# Patient Record
Sex: FEMALE | Race: WHITE | URBAN - METROPOLITAN AREA
[De-identification: names, ages, dates, MRNs, and addresses within clinical notes are randomized per-mention and may not be internally consistent; named-entity substitution may affect disease eponyms.]

---

## 2019-01-31 ENCOUNTER — TRANSFERRED RECORDS (OUTPATIENT)
Dept: HEALTH INFORMATION MANAGEMENT | Facility: CLINIC | Age: 74
End: 2019-01-31

## 2019-02-14 ENCOUNTER — OFFICE VISIT (OUTPATIENT)
Dept: RADIATION THERAPY | Facility: OUTPATIENT CENTER | Age: 74
End: 2019-02-14
Payer: MEDICARE

## 2019-02-14 VITALS
RESPIRATION RATE: 18 BRPM | OXYGEN SATURATION: 96 % | HEART RATE: 88 BPM | DIASTOLIC BLOOD PRESSURE: 70 MMHG | WEIGHT: 186.2 LBS | SYSTOLIC BLOOD PRESSURE: 126 MMHG

## 2019-02-14 DIAGNOSIS — K21.9 GERD (GASTROESOPHAGEAL REFLUX DISEASE): ICD-10-CM

## 2019-02-14 RX ORDER — FAMOTIDINE 20 MG/1
20 TABLET, FILM COATED ORAL 2 TIMES DAILY
COMMUNITY
Start: 2018-12-12

## 2019-02-14 RX ORDER — CRANBERRY FRUIT EXTRACT 200 MG
2 CAPSULE ORAL DAILY
COMMUNITY
Start: 2018-12-12

## 2019-02-14 RX ORDER — LETROZOLE 2.5 MG/1
2.5 TABLET, FILM COATED ORAL DAILY
COMMUNITY
Start: 2019-01-31 | End: 2019-10-21

## 2019-02-14 RX ORDER — AMOXICILLIN 500 MG
1200 CAPSULE ORAL DAILY
COMMUNITY

## 2019-02-14 SDOH — HEALTH STABILITY: MENTAL HEALTH: HOW OFTEN DO YOU HAVE A DRINK CONTAINING ALCOHOL?: NEVER

## 2019-02-14 NOTE — LETTER
2019      RE: Orly Sheridan  [address]  [facility] [address]          Department of Radiation Oncology  Radiation Therapy Center  [facility]  [address], [address]  [place] [address]  [phone]     Consultation Note    Name: Orly Sheridan MRN: [id number]   : 1945   Date of Service: 2019  Referring: Dr. Tate and Dr. Fuentes     Reason for consultation: Invasive ductal carcinoma of the right breast status post lumpectomy and sentinel lymph node evaluation.  Final pathology demonstrated invasive ductal carcinoma, grade 2, 1.8 cm, ER positive, SC positive, Her2 negative, no LVSI, negative margins, 0 out of 2 sentinel lymph nodes, pathologic T1cN0.  Evaluate role of adjuvant whole breast radiation therapy.    History of Present Illness   Ms. Sheridan is a 73 year old female with newly diagnosed right breast invasive ductal carcinoma status post lumpectomy and sentinel lymph node evaluation.  The patient presents today to discuss potential role of adjuvant whole breast radiation therapy.    Patient underwent a screening mammogram which demonstrated a spiculated mass in the right breast.  Diagnostic imaging on 2018 demonstrated a 1.8 x 1.7 x 1.1 cm mass in the right breast, at the 12 o'clock position, 9 cm from the nipple.  Patient subsequent underwent biopsy of the breast mass on 2018.  Final pathology demonstrated invasive ductal carcinoma, grade 2, no LV SI, positive DCIS, SC positive, SC positive, HER-2 negative.  On 2019 the patient underwent right breast lumpectomy and sentinel lymph node evaluation by Dr. Tate. Final pathology demonstrated invasive ductal carcinoma, grade 2, 1.8 cm, ER positive, SC positive, Her2 negative, no LVSI, negative margins, 0 out of 2 sentinel lymph nodes, pathologic T1cN0.  Postoperative the patient was seen by Dr. Fuentes who recommended adjuvant anti-endocrine therapy.  Patient presents today to discuss the potential role  of adjuvant whole breast radiation therapy.    Is overall doing well.  No issues with range of motion.  No history of lymphedema.  No prior history of radiation.  No history of connective tissue disorder.  No prior history of radiation.  No pacemaker.    Past Medical History:   No past medical history on file.    Past Surgical History:   No past surgical history on file.    Chemotherapy History:  none    Radiation History:  none    Pregnant: Not Applicable  Implanted Cardiac Devices: No    Medications:  No current outpatient medications on file.     No current facility-administered medications for this visit.        Allergies:   Allergies not on file    Family History:  No family history on file.    Review of Systems   A 10-point review of systems was performed. Pertinent findings are noted in the HPI.    Physical Exam   ECOG Status: 1    Vitals:  There were no vitals taken for this visit.    Gen: Alert, in NAD  Head: NC/AT  Eyes: PERRL, EOMI, sclera anicteric  Ears: No external auricular lesions  Nose/sinus: No rhinorrhea or epistaxis  Oral cavity/oropharynx: MMM, no visible oral cavity lesions, FOM and BOT are soft to palpation  Neck: Full ROM, supple, no palpable adenopathy  Pulm: No wheezing, stridor or respiratory distress  CV: Extremities are warm and well-perfused, no cyanosis, no pedal edema  Abdominal: Normal bowel sounds, soft, nontender, no masses  Musculoskeletal: Normal bulk and tone, no issues with ROM  Lymph: No extremity lymphedema.   Skin: Normal color and turgor  Neuro: A/Ox3, CN II-XII intact, normal gait    Imaging/Path/Labs   Imagin/18/18  Mammogram  Spiculated lesion 12 o'clock position of the RIGHT breast, approximately 9   cm from the nipple, highly worrisome for malignancy.      FINDINGS:   The breasts are scattered fibroglandular breast densities.  In the 12   o'clock position of the RIGHT breast, 9 cm from the nipple, there is a   spiculated-appearing lesion measuring 1.8 x 1.7 x  1.1 cm. An ultrasound   was performed.  On ultrasound, there is a ill-defined, hypoechoic lesion   measuring approximately 1.4 x 0.9 cm. This is posterior shadowing and   spiculated in appearance. This is highly worrisome for malignancy.  Biopsy   was subsequently performed.      BI-RADS Category 5: Highly Suggestive of Malignancy        Path:   12/18/18  A) RIGHT BREAST, 12:00, 9 CM FROM NIPPLE, STEREOTACTIC-GUIDED CORE BIOPSY:  1. Invasive ductal carcinoma     a. [place] grade: II of III; Linda score: 6 of 9     b. Angio-lymphatic invasion: Absent     c. Associated DCIS: Present (focal)     d. Subtype: Cribriform with calcifications      e. Grade of DCIS: 2 of 3  2. Breast Ancillary Testing:        a. Hormone Receptors:             Estrogen receptor: Positive (99%, strong staining)            Progesterone receptor: Positive (8%, moderate staining)      b. HER2 by IHC: Negative (1+ by manual morphometry)      1/18/19  A) RIGHT BREAST, SEED-LOCALIZED LUMPECTOMY:  1. Invasive ductal carcinoma, Linda grade II of III      a. Invasive tumor size: 1.8 cm      b. Invasive carcinoma is located less than 0.1 cm from the inferior margin (see part C for final anterior-inferior margin status)      c. Invasive carcinoma is located less than 0.1 cm from the final posterior margin and 0.15 cm from the final lateral margin (see comment)    2. Breast Ancillary Testing: Performed on prior case ([id number])      a. Hormone Receptors:             Estrogen receptor: Positive (99%, strong staining)            Progesterone receptor: Positive (8%, moderate staining)      b. HER2 by IHC: Negative (1+ by manual morphometry)  3. Core biopsy site is associated with tumor     B) RIGHT AXILLARY SENTINEL LYMPH NODE, EXCISION:  1. One benign lymph node    C) RIGHT BREAST, ADDITIONAL ANTERIOR-INFERIOR MARGINS, EXCISION:  1. Benign breast tissue  2. Negative for atypia and malignancy    Assessment    Ms. Sheridan is a 73 year old female  with newly diagnosed right breast invasive ductal carcinoma status post lumpectomy and sentinel lymph node evaluation. Final pathology demonstrated invasive ductal carcinoma, grade 2, 1.8 cm, ER positive, [place] positive, Her2 negative, no LVSI, negative margins, 0 out of 2 sentinel lymph nodes, pathologic T1cN0.   The patient presents today to discuss potential role of adjuvant whole breast radiation therapy.    Plan   We discussed that adjuvant radiation is the standard of care for patients with invasive ductal carcinoma after lumpectomy. The most recent update of the EBCTCG metaanalysis for early stage breast cancer showed that for patients with pathologically node negative disease, radiotherapy reduces the 5 year risk of any recurrence from 31% to 15% (Lancet, 2011). This translated into a 3.3% absolute survival benefit at 15 years for all-comers.     However, we also discussed that this benefit is reduced for older patients. In the CALGB trial (Paola, Lancet, 2013), patients over 70 years of age with hormone positive T1 invasive ductal carcinoma after lumpectomy were treated with tamoxifen alone versus tamoxifen plus radiation. They experienced locoregional recurrence of 2% versus 10% at 10 years with no difference in overall survival.     Ms. Sheridan does  meet eligibility criteria for the aforementioned CALGB trial and therefore, would be a candidate for adjuvant RT or observation. If we would treat with radiation, I would recommend adjuvant radiation therapy to the right breast without a boost.    If the patient decides to be treated with RT, we would recommend hypofractionated radiation therapy based on results from the [place] hypofractionation trial (Harini et al, NEJM, 2010) and the recent update of the [place] START trial (Reji et al, Lancet Oncol, 2013), which both show equivalent local control, survival and cosmesis with these shorter treatment schedules as compared to conventional radiation fractionation.  After our discussion, the patient is interested in proceeding with hypofractionated treatment to the right breast.  We recommend a dose of 4005 cGy in 15 fractions.    Today we discussed the logistics of treatment planning and delivery in detail with the patient. We also discussed side effects, including short term risks of fatigue and skin reaction, and long term risks of pneumonitis, lung fibrosis, soft tissue fibrosis, skin changes, breast contour changes, rib fractures, cardiac damage, secondary cancers, lymphedema, and thyroid dysfunction. We described the use of 3D-conformal radiotherapy to minimize dose to the normal tissues, while adequately covering the target tissues, and the ability of this technique to decrease potential for toxicity.    With regards to sparing of the heart, we discussed the use of deep inspiration breath hold for treatment planning and delivery. This method has been shown to significantly reduce dose to the lung and heart as compared to treatment with free breathing (Christ et al, AJCO, 2012). We discussed that this will involve 3D-conformal treatment planning, with contouring of the heart, and that limiting heart dose will be a high priority for treatment planning, as will dose to the contralateral breast and lung.     The risks and benefits of radiation therapy were discussed in detail with the patient. The patient expressed an understanding of these risks and has agreed to proceed with the proposed treatment. Informed consent was obtained. CT simulation has been scheduled for next week.    Anurag Saunders MD  Department of Radiation Oncology  [facility]

## 2019-02-14 NOTE — PROGRESS NOTES
Department of Radiation Oncology  Radiation Therapy Center  [facility]  [address], [address]  [place] [address]  [phone]       Consultation Note    Name: Orly Sheridan MRN: [id number]   : 1945   Date of Service: 2019  Referring: Dr. Tate and Dr. Fuentes     Reason for consultation: Invasive ductal carcinoma of the right breast status post lumpectomy and sentinel lymph node evaluation.  Final pathology demonstrated invasive ductal carcinoma, grade 2, 1.8 cm, ER positive, [place] positive, Her2 negative, no LVSI, negative margins, 0 out of 2 sentinel lymph nodes, pathologic T1cN0.  Evaluate role of adjuvant whole breast radiation therapy.    History of Present Illness   Ms. Sheridan is a 73 year old female with newly diagnosed right breast invasive ductal carcinoma status post lumpectomy and sentinel lymph node evaluation.  The patient presents today to discuss potential role of adjuvant whole breast radiation therapy.    Patient underwent a screening mammogram which demonstrated a spiculated mass in the right breast.  Diagnostic imaging on 2018 demonstrated a 1.8 x 1.7 x 1.1 cm mass in the right breast, at the 12 o'clock position, 9 cm from the nipple.  Patient subsequent underwent biopsy of the breast mass on 2018.  Final pathology demonstrated invasive ductal carcinoma, grade 2, no LV SI, positive DCIS, TX positive, TX positive, HER-2 negative.  On 2019 the patient underwent right breast lumpectomy and sentinel lymph node evaluation by Dr. Tate. Final pathology demonstrated invasive ductal carcinoma, grade 2, 1.8 cm, ER positive, TX positive, Her2 negative, no LVSI, negative margins, 0 out of 2 sentinel lymph nodes, pathologic T1cN0.  Postoperative the patient was seen by Dr. Fuentes who recommended adjuvant anti-endocrine therapy.  Patient presents today to discuss the potential role of adjuvant whole breast radiation therapy.    Is overall doing  well.  No issues with range of motion.  No history of lymphedema.  No prior history of radiation.  No history of connective tissue disorder.  No prior history of radiation.  No pacemaker.      Past Medical History:   No past medical history on file.    Past Surgical History:   No past surgical history on file.    Chemotherapy History:  none    Radiation History:  none    Pregnant: Not Applicable  Implanted Cardiac Devices: No    Medications:  No current outpatient medications on file.     No current facility-administered medications for this visit.          Allergies:   Allergies not on file    Family History:  No family history on file.    Review of Systems   A 10-point review of systems was performed. Pertinent findings are noted in the HPI.    Physical Exam   ECOG Status: 1    Vitals:  There were no vitals taken for this visit.    Gen: Alert, in NAD  Head: NC/AT  Eyes: PERRL, EOMI, sclera anicteric  Ears: No external auricular lesions  Nose/sinus: No rhinorrhea or epistaxis  Oral cavity/oropharynx: MMM, no visible oral cavity lesions, FOM and BOT are soft to palpation  Neck: Full ROM, supple, no palpable adenopathy  Pulm: No wheezing, stridor or respiratory distress  CV: Extremities are warm and well-perfused, no cyanosis, no pedal edema  Abdominal: Normal bowel sounds, soft, nontender, no masses  Musculoskeletal: Normal bulk and tone, no issues with ROM  Lymph: No extremity lymphedema.   Skin: Normal color and turgor  Neuro: A/Ox3, CN II-XII intact, normal gait    Imaging/Path/Labs   Imagin/18/18  Mammogram  Spiculated lesion 12 o'clock position of the RIGHT breast, approximately 9   cm from the nipple, highly worrisome for malignancy.      FINDINGS:   The breasts are scattered fibroglandular breast densities.  In the 12   o'clock position of the RIGHT breast, 9 cm from the nipple, there is a   spiculated-appearing lesion measuring 1.8 x 1.7 x 1.1 cm. An ultrasound   was performed.  On ultrasound, there  is a ill-defined, hypoechoic lesion   measuring approximately 1.4 x 0.9 cm. This is posterior shadowing and   spiculated in appearance. This is highly worrisome for malignancy.  Biopsy   was subsequently performed.      BI-RADS Category 5: Highly Suggestive of Malignancy        Path:   12/18/18  A) RIGHT BREAST, 12:00, 9 CM FROM NIPPLE, STEREOTACTIC-GUIDED CORE BIOPSY:  1. Invasive ductal carcinoma     a. Linda grade: II of III; Linda score: 6 of 9     b. Angio-lymphatic invasion: Absent     c. Associated DCIS: Present (focal)     d. Subtype: Cribriform with calcifications      e. Grade of DCIS: 2 of 3  2. Breast Ancillary Testing:        a. Hormone Receptors:             Estrogen receptor: Positive (99%, strong staining)            Progesterone receptor: Positive (8%, moderate staining)      b. HER2 by IHC: Negative (1+ by manual morphometry)      1/18/19  A) RIGHT BREAST, SEED-LOCALIZED LUMPECTOMY:  1. Invasive ductal carcinoma, [place] grade II of III      a. Invasive tumor size: 1.8 cm      b. Invasive carcinoma is located less than 0.1 cm from the inferior margin (see part C for final anterior-inferior margin status)      c. Invasive carcinoma is located less than 0.1 cm from the final posterior margin and 0.15 cm from the final lateral margin (see comment)    2. Breast Ancillary Testing: Performed on prior case ([id number])      a. Hormone Receptors:             Estrogen receptor: Positive (99%, strong staining)            Progesterone receptor: Positive (8%, moderate staining)      b. HER2 by IHC: Negative (1+ by manual morphometry)  3. Core biopsy site is associated with tumor     B) RIGHT AXILLARY SENTINEL LYMPH NODE, EXCISION:  1. One benign lymph node    C) RIGHT BREAST, ADDITIONAL ANTERIOR-INFERIOR MARGINS, EXCISION:  1. Benign breast tissue  2. Negative for atypia and malignancy    Assessment    Ms. Sheriadn is a 73 year old female with newly diagnosed right breast invasive ductal carcinoma  status post lumpectomy and sentinel lymph node evaluation. Final pathology demonstrated invasive ductal carcinoma, grade 2, 1.8 cm, ER positive, [place] positive, Her2 negative, no LVSI, negative margins, 0 out of 2 sentinel lymph nodes, pathologic T1cN0.   The patient presents today to discuss potential role of adjuvant whole breast radiation therapy.    Plan   We discussed that adjuvant radiation is the standard of care for patients with invasive ductal carcinoma after lumpectomy. The most recent update of the EBCTCG metaanalysis for early stage breast cancer showed that for patients with pathologically node negative disease, radiotherapy reduces the 5 year risk of any recurrence from 31% to 15% (Lancet, 2011). This translated into a 3.3% absolute survival benefit at 15 years for all-comers.     However, we also discussed that this benefit is reduced for older patients. In the CALGB trial (Paola, Lancet, 2013), patients over 70 years of age with hormone positive T1 invasive ductal carcinoma after lumpectomy were treated with tamoxifen alone versus tamoxifen plus radiation. They experienced locoregional recurrence of 2% versus 10% at 10 years with no difference in overall survival.     Ms. Sheridan does  meet eligibility criteria for the aforementioned CALGB trial and therefore, would be a candidate for adjuvant RT or observation. If we would treat with radiation, I would recommend adjuvant radiation therapy to the right breast without a boost.    If the patient decides to be treated with RT, we would recommend hypofractionated radiation therapy based on results from the [place] hypofractionation trial (Harini et al, NEJM, 2010) and the recent update of the [place] START trial (Reji et al, Lancet Oncol, 2013), which both show equivalent local control, survival and cosmesis with these shorter treatment schedules as compared to conventional radiation fractionation. After our discussion, the patient is interested in  proceeding with hypofractionated treatment to the right breast.  We recommend a dose of 4005 cGy in 15 fractions.    Today we discussed the logistics of treatment planning and delivery in detail with the patient. We also discussed side effects, including short term risks of fatigue and skin reaction, and long term risks of pneumonitis, lung fibrosis, soft tissue fibrosis, skin changes, breast contour changes, rib fractures, cardiac damage, secondary cancers, lymphedema, and thyroid dysfunction. We described the use of 3D-conformal radiotherapy to minimize dose to the normal tissues, while adequately covering the target tissues, and the ability of this technique to decrease potential for toxicity.    With regards to sparing of the heart, we discussed the use of deep inspiration breath hold for treatment planning and delivery. This method has been shown to significantly reduce dose to the lung and heart as compared to treatment with free breathing (Christ et al, AJCO, 2012). We discussed that this will involve 3D-conformal treatment planning, with contouring of the heart, and that limiting heart dose will be a high priority for treatment planning, as will dose to the contralateral breast and lung.     The risks and benefits of radiation therapy were discussed in detail with the patient. The patient expressed an understanding of these risks and has agreed to proceed with the proposed treatment. Informed consent was obtained. CT simulation has been scheduled for next week.    Anurag Saunders MD  Department of Radiation Oncology  [facility]

## 2019-02-14 NOTE — NURSING NOTE
REASON FOR APPOINTMENT   Type of Cancer: IDC   Location: R breast   Date of Symptom Onset: mammogram screening    TREATMENT TO-DATE FOR THIS CANCER  Surgery ? 1/18/19 [facility]   Chemotherapy ? Not indicated. Will follow with Dr. Fuentes [facility] for antihormone therapy, femara   Other Treatments for this Cancer ? Radiation - discussion for treatment    PERSONAL HISTORY OF CANCER   Previous Cancer ? no   Prior Radiation ? no   Prior Chemotherapy ? no   Prior Hormonal Therapy ? no     RECENT IMAGING STUDIES  mammogram    REFERRALS NEEDED  None at this time    VITALS  /70   Pulse 88   Resp 18   Wt 84.5 kg (186 lb 3.2 oz)   SpO2 96%     PACEMAKER/IMPLANTED CARDIAC DEVICE no    PAIN  Denies    PSYCHOSOCIAL  Marital Status:   Patient lives in [place] (10 m out of town) with , Mohamud.  Number of children: 3  Working status: retired  Do you feel safe in your home? Yes    REVIEW OF SYSTEMS  Skin: surgical site healing well per pt report  Eyes: glasses  Ears/Nose/Throat: negative  Respiratory: No shortness of breath, dyspnea on exertion, cough, or hemoptysis  Cardiovascular: irregular heart beat  Gastrointestinal: negative  Genitourinary: negative  Musculoskeletal: negative  Neurologic: negative  Psychiatric: negative  Hematologic/Lymphatic/Immunologic: negative  Endocrine: negative    WOMEN ONLY  Any chance you may be pregnant: No  Age at first period: 15  Date of last period: 54  Number of pregnancies: 3  Birth Control pills: Yes  If yes, for how long: 3 mo    Radiation Oncology Patient Teaching    Current Concern: what will I feel like during radiation?    Person involved with teaching: Patient and   Patient asked Questions: Yes  Patient was cooperative: Yes  Patient was receptive (willing to accept information given): Yes    Education Assessment  Comprehension ability: Medium  Knowledge level: Medium  Factors affecting teaching: None    Education Materials Given  Radiation Therapy and  You  Radiation to the Breast  Caring for Your Skin During Radiation ...    Educational Topics Discussed  Side effects, Activity, Nutrition, Adjustment to illness and When to call MD/RN    Response To Teaching  More review necessary    Do you have an advanced directive or living will? no  Are you DNR/DNI? no

## 2019-02-21 ENCOUNTER — APPOINTMENT (OUTPATIENT)
Dept: RADIATION THERAPY | Facility: OUTPATIENT CENTER | Age: 74
End: 2019-02-21
Payer: MEDICARE

## 2019-02-26 ENCOUNTER — AMBULATORY - HEALTHEAST (OUTPATIENT)
Dept: OTHER | Facility: CLINIC | Age: 74
End: 2019-02-26

## 2019-02-26 ENCOUNTER — DOCUMENTATION ONLY (OUTPATIENT)
Dept: OTHER | Facility: CLINIC | Age: 74
End: 2019-02-26

## 2019-03-04 ENCOUNTER — APPOINTMENT (OUTPATIENT)
Dept: RADIATION THERAPY | Facility: OUTPATIENT CENTER | Age: 74
End: 2019-03-04
Payer: MEDICARE

## 2019-03-05 ENCOUNTER — APPOINTMENT (OUTPATIENT)
Dept: RADIATION THERAPY | Facility: OUTPATIENT CENTER | Age: 74
End: 2019-03-05
Payer: MEDICARE

## 2019-03-06 ENCOUNTER — APPOINTMENT (OUTPATIENT)
Dept: RADIATION THERAPY | Facility: OUTPATIENT CENTER | Age: 74
End: 2019-03-06
Payer: MEDICARE

## 2019-03-06 ENCOUNTER — OFFICE VISIT (OUTPATIENT)
Dept: RADIATION THERAPY | Facility: OUTPATIENT CENTER | Age: 74
End: 2019-03-06
Payer: MEDICARE

## 2019-03-06 VITALS — WEIGHT: 185 LBS

## 2019-03-06 DIAGNOSIS — C50.919 MALIGNANT NEOPLASM OF FEMALE BREAST, UNSPECIFIED ESTROGEN RECEPTOR STATUS, UNSPECIFIED LATERALITY, UNSPECIFIED SITE OF BREAST (H): Primary | ICD-10-CM

## 2019-03-06 ASSESSMENT — PAIN SCALES - GENERAL: PAINLEVEL: NO PAIN (0)

## 2019-03-06 NOTE — LETTER
3/6/2019      RE: Orly Sheridan  [address]  [facility] [address]       [facility]  SPECIALIZING IN BREAKTHROUGHS  Radiation Oncology    On Treatment Visit Note    Orly Sheridan      Date: 3/6/2019   MRN: [id number]   : 1945  Diagnosis: IDC St I      Reason for Visit:  On Radiation Treatment Visit     Treatment Summary to Date  Treatment Site: R breast Current Dose: 801/4005 cGy Fractions: 3/15      Subjective:   Doing well. No breast pain or tenderness. No pruritus. Doing well. Applying skin creams.     Nursing ROS:   Nutrition Alteration  Diet Type: Patient's Preference  Skin  Skin Reaction: 0 - No changes  Skin Note: started aquaphor     Cardiovascular  Respiratory effort: 1 - Normal - without distress           Pain Assessment  0-10 Pain Scale: 0    Objective:   Wt 83.9 kg (185 lb)    No skin erythema or desquamation.    Labs:  CBC RESULTS: No results for input(s): WBC, RBC, HGB, HCT, MCV, MCH, MCHC, RDW, PLT in the last 94801 hours.  ELECTROLYTES:  No results for input(s): NA, POTASSIUM, CHLORIDE, NATALIE, CO2, BUN, CR, GLC in the last 13516 hours.    Assessment:    Tolerating radiation therapy well.  All questions and concerns addressed.    Plan:     1. Continue current therapy.    2. Continue skin care.  3. Neosporin with pain relief or ibuprofen PRN pain.      Mosaiq chart and setup information reviewed  Ports checked    Medication Review  Med list reviewed with patient?: Yes    Educational Topic Discussed  Education Instructions: reviewed skin care      Anurag Saunders MD

## 2019-03-06 NOTE — PROGRESS NOTES
[facility] PHYSICIANS  SPECIALIZING IN BREAKTHROUGHS  Radiation Oncology    On Treatment Visit Note      Orly Sheridan      Date: 3/6/2019   MRN: [id number]   : 1945  Diagnosis: IDC St I      Reason for Visit:  On Radiation Treatment Visit     Treatment Summary to Date  Treatment Site: R breast Current Dose: 801/4005 cGy Fractions: 3/15           Subjective:   Doing well. No breast pain or tenderness. No pruritus. Doing well. Applying skin creams.     Nursing ROS:   Nutrition Alteration  Diet Type: Patient's Preference  Skin  Skin Reaction: 0 - No changes  Skin Note: started aquaphor        Cardiovascular  Respiratory effort: 1 - Normal - without distress           Pain Assessment  0-10 Pain Scale: 0      Objective:   Wt 83.9 kg (185 lb)    No skin erythema or desquamation.    Labs:  CBC RESULTS: No results for input(s): WBC, RBC, HGB, HCT, MCV, MCH, MCHC, RDW, PLT in the last 28503 hours.  ELECTROLYTES:  No results for input(s): NA, POTASSIUM, CHLORIDE, NATALIE, CO2, BUN, CR, GLC in the last 56220 hours.    Assessment:    Tolerating radiation therapy well.  All questions and concerns addressed.    Plan:     1. Continue current therapy.    2. Continue skin care.  3. Neosporin with pain relief or ibuprofen PRN pain.      Mosaiq chart and setup information reviewed  Ports checked    Medication Review  Med list reviewed with patient?: Yes    Educational Topic Discussed  Education Instructions: reviewed skin care      Anurag Saunders MD

## 2019-03-07 ENCOUNTER — APPOINTMENT (OUTPATIENT)
Dept: RADIATION THERAPY | Facility: OUTPATIENT CENTER | Age: 74
End: 2019-03-07
Payer: MEDICARE

## 2019-03-08 ENCOUNTER — APPOINTMENT (OUTPATIENT)
Dept: RADIATION THERAPY | Facility: OUTPATIENT CENTER | Age: 74
End: 2019-03-08
Payer: MEDICARE

## 2019-03-11 ENCOUNTER — APPOINTMENT (OUTPATIENT)
Dept: RADIATION THERAPY | Facility: OUTPATIENT CENTER | Age: 74
End: 2019-03-11
Payer: MEDICARE

## 2019-03-12 ENCOUNTER — APPOINTMENT (OUTPATIENT)
Dept: RADIATION THERAPY | Facility: OUTPATIENT CENTER | Age: 74
End: 2019-03-12
Payer: MEDICARE

## 2019-03-13 ENCOUNTER — OFFICE VISIT (OUTPATIENT)
Dept: RADIATION THERAPY | Facility: OUTPATIENT CENTER | Age: 74
End: 2019-03-13
Payer: MEDICARE

## 2019-03-13 ENCOUNTER — APPOINTMENT (OUTPATIENT)
Dept: RADIATION THERAPY | Facility: OUTPATIENT CENTER | Age: 74
End: 2019-03-13
Payer: MEDICARE

## 2019-03-13 VITALS
RESPIRATION RATE: 18 BRPM | WEIGHT: 185.8 LBS | HEART RATE: 74 BPM | DIASTOLIC BLOOD PRESSURE: 81 MMHG | SYSTOLIC BLOOD PRESSURE: 148 MMHG | OXYGEN SATURATION: 97 %

## 2019-03-13 DIAGNOSIS — C50.919 MALIGNANT NEOPLASM OF BREAST IN FEMALE, ESTROGEN RECEPTOR POSITIVE, UNSPECIFIED LATERALITY, UNSPECIFIED SITE OF BREAST (H): Primary | ICD-10-CM

## 2019-03-13 DIAGNOSIS — Z17.0 MALIGNANT NEOPLASM OF BREAST IN FEMALE, ESTROGEN RECEPTOR POSITIVE, UNSPECIFIED LATERALITY, UNSPECIFIED SITE OF BREAST (H): Primary | ICD-10-CM

## 2019-03-13 NOTE — PROGRESS NOTES
[facility] PHYSICIANS  SPECIALIZING IN BREAKTHROUGHS  Radiation Oncology    On Treatment Visit Note      Orly Sheridan      Date: 3/13/2019   MRN: [id number]   : 1945  Diagnosis: IDC St I      Reason for Visit:  On Radiation Treatment Visit     Treatment Summary to Date  Treatment Site: R breast Current Dose: 2134/4005 cGy Fractions: 8/15           Subjective:   Doing well. No breast pain or tenderness. No pruritus. Doing well. Applying skin creams.     Nursing ROS:   Nutrition Alteration  Diet Type: Patient's Preference  Skin  Skin Reaction: 0 - No changes  Skin Note: started aquaphor        Cardiovascular  Respiratory effort: 1 - Normal - without distress           Pain Assessment  0-10 Pain Scale: 0      Objective:   /81   Pulse 74   Resp 18   Wt 84.3 kg (185 lb 12.8 oz)   SpO2 97%   No skin erythema or desquamation.    Labs:  CBC RESULTS: No results for input(s): WBC, RBC, HGB, HCT, MCV, MCH, MCHC, RDW, PLT in the last 22550 hours.  ELECTROLYTES:  No results for input(s): NA, POTASSIUM, CHLORIDE, NATALIE, CO2, BUN, CR, GLC in the last 42482 hours.    Assessment:    Tolerating radiation therapy well.  All questions and concerns addressed.    Plan:     1. Continue current therapy.    2. Continue skin care.  3. Neosporin with pain relief or ibuprofen PRN pain.      Mosaiq chart and setup information reviewed  Ports checked    Medication Review  Med list reviewed with patient?: Yes    Educational Topic Discussed  Education Instructions: reviewed skin care      Anurag Saunders MD

## 2019-03-13 NOTE — LETTER
3/13/2019      RE: Orly Sheridan  [address]  [facility] [address]       [facility] PHYSICIANS  SPECIALIZING IN BREAKTHROUGHS  Radiation Oncology    On Treatment Visit Note      Orly Sheridan      Date: 3/13/2019   MRN: [id number]   : 1945  Diagnosis: IDC St I      Reason for Visit:  On Radiation Treatment Visit     Treatment Summary to Date  Treatment Site: R breast Current Dose: 2134/4005 cGy Fractions: 8/15           Subjective:   Doing well. No breast pain or tenderness. No pruritus. Doing well. Applying skin creams.     Nursing ROS:   Nutrition Alteration  Diet Type: Patient's Preference  Skin  Skin Reaction: 0 - No changes  Skin Note: started aquaphor        Cardiovascular  Respiratory effort: 1 - Normal - without distress           Pain Assessment  0-10 Pain Scale: 0      Objective:   /81   Pulse 74   Resp 18   Wt 84.3 kg (185 lb 12.8 oz)   SpO2 97%   No skin erythema or desquamation.    Labs:  CBC RESULTS: No results for input(s): WBC, RBC, HGB, HCT, MCV, MCH, MCHC, RDW, PLT in the last 46355 hours.  ELECTROLYTES:  No results for input(s): NA, POTASSIUM, CHLORIDE, NATALIE, CO2, BUN, CR, GLC in the last 45966 hours.    Assessment:    Tolerating radiation therapy well.  All questions and concerns addressed.    Plan:     1. Continue current therapy.    2. Continue skin care.  3. Neosporin with pain relief or ibuprofen PRN pain.      Mosaiq chart and setup information reviewed  Ports checked    Medication Review  Med list reviewed with patient?: Yes    Educational Topic Discussed  Education Instructions: reviewed skin care      Anurag Saunders MD

## 2019-03-14 ENCOUNTER — APPOINTMENT (OUTPATIENT)
Dept: RADIATION THERAPY | Facility: OUTPATIENT CENTER | Age: 74
End: 2019-03-14
Payer: MEDICARE

## 2019-03-15 ENCOUNTER — APPOINTMENT (OUTPATIENT)
Dept: RADIATION THERAPY | Facility: OUTPATIENT CENTER | Age: 74
End: 2019-03-15
Payer: MEDICARE

## 2019-03-18 ENCOUNTER — APPOINTMENT (OUTPATIENT)
Dept: RADIATION THERAPY | Facility: OUTPATIENT CENTER | Age: 74
End: 2019-03-18
Payer: MEDICARE

## 2019-03-19 ENCOUNTER — APPOINTMENT (OUTPATIENT)
Dept: RADIATION THERAPY | Facility: OUTPATIENT CENTER | Age: 74
End: 2019-03-19
Payer: MEDICARE

## 2019-03-20 ENCOUNTER — OFFICE VISIT (OUTPATIENT)
Dept: RADIATION THERAPY | Facility: OUTPATIENT CENTER | Age: 74
End: 2019-03-20
Payer: MEDICARE

## 2019-03-20 ENCOUNTER — APPOINTMENT (OUTPATIENT)
Dept: RADIATION THERAPY | Facility: OUTPATIENT CENTER | Age: 74
End: 2019-03-20
Payer: MEDICARE

## 2019-03-20 VITALS
HEART RATE: 80 BPM | RESPIRATION RATE: 18 BRPM | DIASTOLIC BLOOD PRESSURE: 81 MMHG | OXYGEN SATURATION: 96 % | SYSTOLIC BLOOD PRESSURE: 175 MMHG | WEIGHT: 187.6 LBS

## 2019-03-20 DIAGNOSIS — Z17.0 MALIGNANT NEOPLASM OF BREAST IN FEMALE, ESTROGEN RECEPTOR POSITIVE, UNSPECIFIED LATERALITY, UNSPECIFIED SITE OF BREAST (H): Primary | ICD-10-CM

## 2019-03-20 DIAGNOSIS — C50.919 MALIGNANT NEOPLASM OF BREAST IN FEMALE, ESTROGEN RECEPTOR POSITIVE, UNSPECIFIED LATERALITY, UNSPECIFIED SITE OF BREAST (H): Primary | ICD-10-CM

## 2019-03-20 NOTE — PROGRESS NOTES
[facility] PHYSICIANS  SPECIALIZING IN BREAKTHROUGHS  Radiation Oncology    On Treatment Visit Note      Orly Sheridan      Date: 3/20/2019   MRN: [id number]   : 1945  Diagnosis: IDC St I      Reason for Visit:  On Radiation Treatment Visit     Treatment Summary to Date  Treatment Site: R breast Current Dose: 2134/4005 cGy Fractions: 13/15           Subjective:   Doing well. No breast pain or tenderness. No pruritus. Doing well. Applying skin creams.     Nursing ROS:   Nutrition Alteration  Diet Type: Patient's Preference  Skin  Skin Reaction: 0 - No changes  Skin Note: started aquaphor        Cardiovascular  Respiratory effort: 1 - Normal - without distress           Pain Assessment  0-10 Pain Scale: 0      Objective:   /81   Pulse 74   Resp 18   Wt 84.3 kg (185 lb 12.8 oz)   SpO2 97%   No skin erythema or desquamation.    Labs:  CBC RESULTS: No results for input(s): WBC, RBC, HGB, HCT, MCV, MCH, MCHC, RDW, PLT in the last 11262 hours.  ELECTROLYTES:  No results for input(s): NA, POTASSIUM, CHLORIDE, NATALIE, CO2, BUN, CR, GLC in the last 76374 hours.    Assessment:    Tolerating radiation therapy well.  All questions and concerns addressed.    Plan:     1. Continue current therapy.  EOT Friday. RTC in 1 month with NP.  2. Continue skin care.  3. Neosporin with pain relief or ibuprofen PRN pain.      Mosaiq chart and setup information reviewed  Ports checked    Medication Review  Med list reviewed with patient?: Yes    Educational Topic Discussed  Education Instructions: reviewed skin care      Anurag Saunders MD

## 2019-03-21 ENCOUNTER — APPOINTMENT (OUTPATIENT)
Dept: RADIATION THERAPY | Facility: OUTPATIENT CENTER | Age: 74
End: 2019-03-21
Payer: MEDICARE

## 2019-03-22 ENCOUNTER — APPOINTMENT (OUTPATIENT)
Dept: RADIATION THERAPY | Facility: OUTPATIENT CENTER | Age: 74
End: 2019-03-22
Payer: MEDICARE

## 2019-03-28 ENCOUNTER — DOCUMENTATION ONLY (OUTPATIENT)
Dept: RADIATION THERAPY | Facility: OUTPATIENT CENTER | Age: 74
End: 2019-03-28

## 2019-03-28 NOTE — PROGRESS NOTES
Department of Radiation Oncology  Radiation Therapy Center  [facility] [place] [address]  [phone]       Radiotherapy Treatment Summary          2019    PATIENT: Orly Sheridan  MEDICAL RECORD NO: [id number]   : 1945    DIAGNOSIS:  Invasive ductal carcinoma of the right breast status post lumpectomy and sentinel lymph node evaluation  INTENT OF RADIOTHERAPY: curative  PATHOLOGY:  invasive ductal carcinoma, grade 2, 1.8 cm, ER positive, FL positive, Her2 negative, no LVSI, negative margins, 0 out of 2 sentinel lymph nodes                               STAGE: pathologic T1cN0.  CONCURRENT SYSTEMIC THERAPY:  No    ONCOLOGIC HISTORY:     Ms. Sheridan is a 73 year old female with newly diagnosed right breast invasive ductal carcinoma status post lumpectomy and sentinel lymph node evaluation.      Patient underwent a screening mammogram which demonstrated a spiculated mass in the right breast. Diagnostic imaging on 2018 demonstrated a 1.8 x 1.7 x 1.1 cm mass in the right breast, at the 12 o'clock position, 9 cm from the nipple.  Patient subsequently underwent biopsy of the breast mass on 2018.  Final pathology demonstrated invasive ductal carcinoma, grade 2, no LVSI, positive DCIS, ER positive, FL positive, HER-2 negative.  On 2019 the patient underwent right breast lumpectomy and sentinel lymph node evaluation by Dr. Tate. Final pathology demonstrated invasive ductal carcinoma, grade 2, 1.8 cm, ER positive, FL positive, Her2 negative, no LVSI, negative margins, 0 out of 2 sentinel lymph nodes, pathologic T1cN0.  Postoperative the patient was seen by Dr. Fuentes who recommended adjuvant anti-endocrine therapy.         SITE OF TREATMENT: Right breast    DATES  OF TREATMENT: 3/04/19-3/22/19    TOTAL DOSE OF TREATMENT: 4005 cGy    DOSE PER FRACTION OF TREATMENT: 267 cGy x 15 fractions       COMMENT/TOXICITY:     No breast pain or tenderness. No pruritus.  Applying skin creams.              PAIN MANAGEMENT:                           Neosporin with pain relief or ibuprofen PRN pain.    FOLLOW UP PLAN:  1.  RTC in 1 month with NP.  2. Continue skin care.    Radiation Oncologist: Anurag Saunders M.D.  Department of Radiation Oncology  [facility]

## 2019-04-11 NOTE — PROGRESS NOTES
Department of Radiation Oncology  Radiation Therapy Center  [facility] [place] [address]  [phone]     Radiation Oncology Follow-up Visit  19    Orly Sheridan  MRN: [id number]   : 1945     DIAGNOSIS:  Invasive ductal carcinoma of the right breast status post lumpectomy and sentinel lymph node evaluation  INTENT OF RADIOTHERAPY: curative  PATHOLOGY:  invasive ductal carcinoma, grade 2, 1.8 cm, ER positive, MS positive, Her2 negative, no LVSI, negative margins, 0 out of 2 sentinel lymph nodes                               STAGE: pathologic T1cN0.  CONCURRENT SYSTEMIC THERAPY:  No     ONCOLOGIC HISTORY:     Ms. Sheridan is a 73 year old female with newly diagnosed right breast invasive ductal carcinoma status post lumpectomy and sentinel lymph node evaluation.      Patient underwent a screening mammogram which demonstrated a spiculated mass in the right breast. Diagnostic imaging on 2018 demonstrated a 1.8 x 1.7 x 1.1 cm mass in the right breast, at the 12 o'clock position, 9 cm from the nipple.  Patient subsequently underwent biopsy of the breast mass on 2018.  Final pathology demonstrated invasive ductal carcinoma, grade 2, no LVSI, positive DCIS, ER positive, MS positive, HER-2 negative.  On 2019 the patient underwent right breast lumpectomy and sentinel lymph node evaluation by Dr. Tate. Final pathology demonstrated invasive ductal carcinoma, grade 2, 1.8 cm, ER positive, MS positive, Her2 negative, no LVSI, negative margins, 0 out of 2 sentinel lymph nodes, pathologic T1cN0.  Postoperative the patient was seen by Dr. Fuentes who recommended adjuvant anti-endocrine therapy (letrozole). Genetic testing negative.     SITE OF TREATMENT: Right breast     DATES  OF TREATMENT: 3/04/19-3/22/19     TOTAL DOSE OF TREATMENT: 4005 cGy     DOSE PER FRACTION OF TREATMENT: 267 cGy x 15 fractions       COMMENT/TOXICITY:     No breast pain or tenderness. No pruritus.  "Applying skin creams.                   INTERVAL SINCE COMPLETION OF RADIATION THERAPY:   1 month    SUBJECTIVE:   Orly Sheridan is a 73 year old female who is here today for routine 1 month follow up after completing radiation therapy.  She has seen healing of her skin reaction. She reports skin irritation last week with sensitivity, nipple tenderness, and sensation that \"bra felt uncomfortable\". Since that time she feels the skin irritation is much improved. Mild erythema reported, intact skin, no desquamation, and hyperpigmentation to right axilla and inframammary fold right. She denies any concerning lumps or masses, nipple discharge bilaterally. She continues to moisturize. Reports well healed incisions with no scar formation.  Denies any cough or shortness of breath related to possible risk of interstitial pneumonitis.  Good ROM. She also has had resolution of her fatigue.  No lymphedema.     Tolerating letrozole with some hot flashes she reports were present before starting medication. She denies any new joint pain. She had history of bilateral lower back discomfort, that is mostly stable. She also has history of mild hair loss - taking biotin and using special shampoo.    ROS: Patient denies any of the following except if noted above:  pain, headaches,chest pain, dyspnea, abdominal pain, nausea, vomiting, diarrhea. ROS otherwise negative on a 12-system review.  ECO       Current Outpatient Medications   Medication     Calcium Carbonate-Vit D-Min (CALCIUM 1200 PO)     cholecalciferol (D 5000) 5000 units CAPS     famotidine (PEPCID) 20 MG tablet     letrozole (FEMARA) 2.5 MG tablet     Omega-3 Fatty Acids (FISH OIL) 1200 MG capsule     Red Yeast Rice Extract 600 MG CAPS     No current facility-administered medications for this visit.           Allergies   Allergen Reactions     Thimerosal Itching     Other reaction(s): Erythema       Past Medical History:   Diagnosis Date     Breast cancer, right breast (H) " 12/18/2018    bx 12/18/18     GERD (gastroesophageal reflux disease)      Family History   Problem Relation Age of Onset     Breast Cancer Paternal Aunt 55     Breast Cancer Maternal Cousin 49        first cousin     Breast Cancer Maternal Cousin 35        second cousin       PHYSICAL EXAM:  /82   Pulse 79   Resp 18   Wt 83.7 kg (184 lb 9.6 oz)   SpO2 96%   Gen: Alert, in NAD  Pulm: No wheezing, stridor or respiratory distress  CV: Well-perfused, no cyanosis, no pedal edema  Skin: Normal color and turgor  Psychiatric: Appropriate mood and affect  BREAST: No masses or discharge bilaterally. Skin intact. Hyperpigmenatation right IMF and axilla consistent with radiation changes. Well healed incisions to right breast.     LABS AND IMAGING:  Reviewed.  [facility]  Bone Density Report  [employer] scanner    Indication:   Post menopausal. Estrogen deficiency. Low calcium intake.   Osteopenia. Vitamin D deficiency.    Results:    The study is a technically valid scan    Location T Score Z score   AP Spine 0.1 1.8   Left Femur -0.9 0.9   Right Femur -1.7 0.0     DIAGNOSIS BY WORLD HEALTH ORGANIZATION GUIDELINES    OSTEOPENIA (LOW BONE MINERAL DENSITY)      IMPRESSION:   Ms. Sheridan is a 73 year old female with newly diagnosed right breast invasive ductal carcinoma status post lumpectomy and sentinel lymph node evaluation. Completed radiotherapy to right breast 4005 cGy on 3/22/19. Here for one month follow-up. Adjuvant Letrozole.     PLAN:     Acute toxicities from RT improving. Has mild residual hyperpigmenation which will continue to improve with time. Discussed long term care with continued moisturizing of the treated breast, and sun screen.  Discussed possibility of scar formation from radiation. Discussed treatment with gentle massage. She will follow up here in 6 months with Dr. Saunders (alternate).    Patient will follow up with medical oncology for continued care and imaging (  Alfredo). (6 mths follow-up). According to NCCN guidelines, follow-up of breast cancer should include history and physical examination with emphasis on breast examination every 4-12 months for 5 years as clinically appropriate then annually thereafter. Emphasis is also on continuing with annual mammography. Last jermaine screening mammogram 12/2018. Emphasis is also on continuing with annual mammography no sooner than 6 months post RT. Plans to have in 6 months then follow-up with surgeon.    Healthy lifestyle.  She should continue to refrain from tobacco. Discussed healthy diet  low in fats.  She should have an exercise program of 150 minutes of cardiovascular exercise per week.  She will continue to see her primary care provider for general health maintenance (Dr. Aracely Swanson).  Continue to follow-up with eye doctor every 1-2 years, and dentist every 6 months for cleanings (she has been going Q 3 months).     Risk of developing osteoporosis. Osteopenia on DXA 2/2019.  She is post menopausal. She should continue to have DEXA scans every 2 years, and was recommended to perform weightbearing exercises and to supplement with 1200 mg of calcium, 1000 international unites of vitamin D daily.     Endocrine therapy. Continues to be adherent to adjuvant endocrine therapy. Tolerating letrozole (femara) well. Reports some hot flashes she reports were present before starting medication. She denies any new joint pain. She had history of bilateral lower back discomfort, that is mostly stable. She also has history of mild hair loss - taking biotene and using special shampoo. Letrozole managed by Dr. Shultz.     Cancer screening.  should undergo routine screening for age group.  She should limit her sun exposure and use sunscreens. Routine colonoscopy completed 2/2019. Next recommended in 5 years (family history).       Patito Coto NP  [facility] Radiation Oncology

## 2019-04-19 ENCOUNTER — OFFICE VISIT (OUTPATIENT)
Dept: RADIATION THERAPY | Facility: OUTPATIENT CENTER | Age: 74
End: 2019-04-19
Payer: MEDICARE

## 2019-04-19 VITALS
SYSTOLIC BLOOD PRESSURE: 151 MMHG | OXYGEN SATURATION: 96 % | RESPIRATION RATE: 18 BRPM | WEIGHT: 184.6 LBS | HEART RATE: 79 BPM | DIASTOLIC BLOOD PRESSURE: 82 MMHG

## 2019-04-19 DIAGNOSIS — C50.911 MALIGNANT NEOPLASM OF RIGHT BREAST IN FEMALE, ESTROGEN RECEPTOR POSITIVE, UNSPECIFIED SITE OF BREAST (H): Primary | ICD-10-CM

## 2019-04-19 DIAGNOSIS — Z17.0 MALIGNANT NEOPLASM OF RIGHT BREAST IN FEMALE, ESTROGEN RECEPTOR POSITIVE, UNSPECIFIED SITE OF BREAST (H): Primary | ICD-10-CM

## 2019-04-19 ASSESSMENT — PAIN SCALES - GENERAL: PAINLEVEL: NO PAIN (0)

## 2019-04-19 NOTE — LETTER
2019      RE: Orly Sheridan  [address]  [facility] [address]             Radiation Oncology Follow-up Visit  19    Orly Sheridan  MRN: [id number]   : 1945     DIAGNOSIS:  Invasive ductal carcinoma of the right breast status post lumpectomy and sentinel lymph node evaluation  INTENT OF RADIOTHERAPY: curative  PATHOLOGY:  invasive ductal carcinoma, grade 2, 1.8 cm, ER positive, NE positive, Her2 negative, no LVSI, negative margins, 0 out of 2 sentinel lymph nodes                               STAGE: pathologic T1cN0.  CONCURRENT SYSTEMIC THERAPY:  No     ONCOLOGIC HISTORY:     Ms. Sheridan is a 73 year old female with newly diagnosed right breast invasive ductal carcinoma status post lumpectomy and sentinel lymph node evaluation.      Patient underwent a screening mammogram which demonstrated a spiculated mass in the right breast. Diagnostic imaging on 2018 demonstrated a 1.8 x 1.7 x 1.1 cm mass in the right breast, at the 12 o'clock position, 9 cm from the nipple.  Patient subsequently underwent biopsy of the breast mass on 2018.  Final pathology demonstrated invasive ductal carcinoma, grade 2, no LVSI, positive DCIS, ER positive, NE positive, HER-2 negative.  On 2019 the patient underwent right breast lumpectomy and sentinel lymph node evaluation by Dr. Tate. Final pathology demonstrated invasive ductal carcinoma, grade 2, 1.8 cm, ER positive, NE positive, Her2 negative, no LVSI, negative margins, 0 out of 2 sentinel lymph nodes, pathologic T1cN0.  Postoperative the patient was seen by Dr. Fuentes who recommended adjuvant anti-endocrine therapy (letrozole). Genetic testing negative.     SITE OF TREATMENT: Right breast     DATES  OF TREATMENT: 3/04/19-3/22/19     TOTAL DOSE OF TREATMENT: 4005 cGy     DOSE PER FRACTION OF TREATMENT: 267 cGy x 15 fractions       COMMENT/TOXICITY:     No breast pain or tenderness. No pruritus. Applying skin creams.                   INTERVAL SINCE  "COMPLETION OF RADIATION THERAPY:   1 month    SUBJECTIVE:   Orly Sheridan is a 73 year old female who is here today for routine 1 month follow up after completing radiation therapy.  She has seen healing of her skin reaction. She reports skin irritation last week with sensitivity, nipple tenderness, and sensation that \"bra felt uncomfortable\". Since that time she feels the skin irritation is much improved. Mild erythema reported, intact skin, no desquamation, and hyperpigmentation to right axilla and inframammary fold right. She denies any concerning lumps or masses, nipple discharge bilaterally. She continues to moisturize. Reports well healed incisions with no scar formation.  Denies any cough or shortness of breath related to possible risk of interstitial pneumonitis.  Good ROM. She also has had resolution of her fatigue.  No lymphedema.     Tolerating letrozole with some hot flashes she reports were present before starting medication. She denies any new joint pain. She had history of bilateral lower back discomfort, that is mostly stable. She also has history of mild hair loss - taking biotin and using special shampoo.    ROS: Patient denies any of the following except if noted above:  pain, headaches,chest pain, dyspnea, abdominal pain, nausea, vomiting, diarrhea. ROS otherwise negative on a 12-system review.  ECO       Current Outpatient Medications   Medication     Calcium Carbonate-Vit D-Min (CALCIUM 1200 PO)     cholecalciferol (D 5000) 5000 units CAPS     famotidine (PEPCID) 20 MG tablet     letrozole (FEMARA) 2.5 MG tablet     Omega-3 Fatty Acids (FISH OIL) 1200 MG capsule     Red Yeast Rice Extract 600 MG CAPS     No current facility-administered medications for this visit.           Allergies   Allergen Reactions     Thimerosal Itching     Other reaction(s): Erythema       Past Medical History:   Diagnosis Date     Breast cancer, right breast (H) 2018    bx 20     GERD (gastroesophageal " reflux disease)      Family History   Problem Relation Age of Onset     Breast Cancer Paternal Aunt 55     Breast Cancer Maternal Cousin 49        first cousin     Breast Cancer Maternal Cousin 35        second cousin       PHYSICAL EXAM:  /82   Pulse 79   Resp 18   Wt 83.7 kg (184 lb 9.6 oz)   SpO2 96%   Gen: Alert, in NAD  Pulm: No wheezing, stridor or respiratory distress  CV: Well-perfused, no cyanosis, no pedal edema  Skin: Normal color and turgor  Psychiatric: Appropriate mood and affect  BREAST: No masses or discharge bilaterally. Skin intact. Hyperpigmenatation right IMF and axilla consistent with radiation changes. Well healed incisions to right breast.     LABS AND IMAGING:  Reviewed.  [facility]  Bone Density Report  [employer] scanner    Indication:   Post menopausal. Estrogen deficiency. Low calcium intake.   Osteopenia. Vitamin D deficiency.    Results:    The study is a technically valid scan    Location T Score Z score   AP Spine 0.1 1.8   Left Femur -0.9 0.9   Right Femur -1.7 0.0     DIAGNOSIS BY WORLD HEALTH ORGANIZATION GUIDELINES    OSTEOPENIA (LOW BONE MINERAL DENSITY)      IMPRESSION:   Ms. Sheridan is a 73 year old female with newly diagnosed right breast invasive ductal carcinoma status post lumpectomy and sentinel lymph node evaluation. Completed radiotherapy to right breast 4005 cGy on 3/22/19. Here for one month follow-up. Adjuvant Letrozole.     PLAN:     Acute toxicities from RT improving. Has mild residual hyperpigmenation which will continue to improve with time. Discussed long term care with continued moisturizing of the treated breast, and sun screen.  Discussed possibility of scar formation from radiation. Discussed treatment with gentle massage. She will follow up here in 6 months with Dr. Saunders (alternate).    Patient will follow up with medical oncology for continued care and imaging (Dr. Fuentes). (6 mths follow-up). According to NCCN  guidelines, follow-up of breast cancer should include history and physical examination with emphasis on breast examination every 4-12 months for 5 years as clinically appropriate then annually thereafter. Emphasis is also on continuing with annual mammography. Last jermaine screening mammogram 12/2018. Emphasis is also on continuing with annual mammography no sooner than 6 months post RT. Plans to have in 6 months then follow-up with surgeon.    Healthy lifestyle.  She should continue to refrain from tobacco. Discussed healthy diet  low in fats.  She should have an exercise program of 150 minutes of cardiovascular exercise per week.  She will continue to see her primary care provider for general health maintenance (Dr. Aracely Swanson).  Continue to follow-up with eye doctor every 1-2 years, and dentist every 6 months for cleanings (she has been going Q 3 months).     Risk of developing osteoporosis. Osteopenia on DXA 2/2019.  She is post menopausal. She should continue to have DEXA scans every 2 years, and was recommended to perform weightbearing exercises and to supplement with 1200 mg of calcium, 1000 international unites of vitamin D daily.     Endocrine therapy. Continues to be adherent to adjuvant endocrine therapy. Tolerating letrozole (femara) well. Reports some hot flashes she reports were present before starting medication. She denies any new joint pain. She had history of bilateral lower back discomfort, that is mostly stable. She also has history of mild hair loss - taking biotene and using special shampoo. Letrozole managed by Dr. Shultz.     Cancer screening.  should undergo routine screening for age group.  She should limit her sun exposure and use sunscreens. Routine colonoscopy completed 2/2019. Next recommended in 5 years (family history).       Patito Coto NP  [facility] Radiation Oncology

## 2019-04-19 NOTE — NURSING NOTE
FOLLOW-UP VISIT    Patient Name: Orly Sheridan      : 1945     Age: 73 year old        ______________________________________________________________________________     Chief Complaint   Patient presents with     Radiation Therapy     follow up     /82   Pulse 79   Resp 18   Wt 83.7 kg (184 lb 9.6 oz)   SpO2 96%      Date Radiation Completed: 1 month follow up, R breast    Pain  Denies    Meds  Current Med List Reviewed: Yes  Medication Note:     Skin: Warm  Dry  Intact    Range of Motion: no complaints    Respiratory: No shortness of breath, dyspnea on exertion, cough, or hemoptysis    Hormone Therapy: Yes    Lymphedema Follow up: No    Energy Level: normal      Appointments:     DATE  Oncologist: Dr. Fuentes    Surgeon:    Primary:      Other Notes:

## 2019-04-23 PROBLEM — C50.911 MALIGNANT NEOPLASM OF RIGHT FEMALE BREAST (H): Status: ACTIVE | Noted: 2019-04-23

## 2019-04-23 PROBLEM — Z80.0 FAMILY HISTORY OF COLON CANCER: Status: ACTIVE | Noted: 2017-12-05

## 2019-09-23 PROBLEM — C50.911 MALIGNANT NEOPLASM OF RIGHT FEMALE BREAST (H): Status: ACTIVE | Noted: 2018-12-18

## 2019-10-21 ENCOUNTER — OFFICE VISIT (OUTPATIENT)
Dept: RADIATION THERAPY | Facility: OUTPATIENT CENTER | Age: 74
End: 2019-10-21
Payer: MEDICARE

## 2019-10-21 VITALS
WEIGHT: 184.8 LBS | OXYGEN SATURATION: 96 % | DIASTOLIC BLOOD PRESSURE: 76 MMHG | RESPIRATION RATE: 18 BRPM | SYSTOLIC BLOOD PRESSURE: 168 MMHG | HEART RATE: 71 BPM

## 2019-10-21 DIAGNOSIS — C50.911 MALIGNANT NEOPLASM OF RIGHT BREAST IN FEMALE, ESTROGEN RECEPTOR POSITIVE, UNSPECIFIED SITE OF BREAST (H): Primary | ICD-10-CM

## 2019-10-21 DIAGNOSIS — I89.0 LYMPHEDEMA OF BREAST: ICD-10-CM

## 2019-10-21 DIAGNOSIS — Z17.0 MALIGNANT NEOPLASM OF RIGHT BREAST IN FEMALE, ESTROGEN RECEPTOR POSITIVE, UNSPECIFIED SITE OF BREAST (H): Primary | ICD-10-CM

## 2019-10-21 RX ORDER — ANASTROZOLE 1 MG/1
1 TABLET ORAL DAILY
COMMUNITY
Start: 2019-10-03 | End: 2020-03-31

## 2019-10-21 RX ORDER — METOPROLOL SUCCINATE 25 MG/1
25 TABLET, EXTENDED RELEASE ORAL DAILY
COMMUNITY
Start: 2019-09-19

## 2019-10-21 ASSESSMENT — PAIN SCALES - GENERAL: PAINLEVEL: NO PAIN (0)

## 2019-10-21 NOTE — PROGRESS NOTES
Department of Radiation Oncology  Radiation Therapy Center  [facility] [place] [address]  [phone]     Radiation Oncology Follow-up Visit  10/21/19    Orly Sheridan  MRN: [id number]   : 1945     DIAGNOSIS:  Invasive ductal carcinoma of the right breast status post lumpectomy and sentinel lymph node evaluation  INTENT OF RADIOTHERAPY: curative  PATHOLOGY:  invasive ductal carcinoma, grade 2, 1.8 cm, ER positive, WI positive, Her2 negative, no LVSI, negative margins, 0 out of 2 sentinel lymph nodes                               STAGE: pathologic T1cN0.  CONCURRENT SYSTEMIC THERAPY:  No     ONCOLOGIC HISTORY:     Ms. Sheridan is a 74  year old female with newly diagnosed right breast invasive ductal carcinoma status post lumpectomy and sentinel lymph node evaluation.      Patient underwent a screening mammogram which demonstrated a spiculated mass in the right breast. Diagnostic imaging on 2018 demonstrated a 1.8 x 1.7 x 1.1 cm mass in the right breast, at the 12 o'clock position, 9 cm from the nipple.  Patient subsequently underwent biopsy of the breast mass on 2018.  Final pathology demonstrated invasive ductal carcinoma, grade 2, no LVSI, positive DCIS, ER positive, WI positive, HER-2 negative.  On 2019 the patient underwent right breast lumpectomy and sentinel lymph node evaluation by Dr. Tate. Final pathology demonstrated invasive ductal carcinoma, grade 2, 1.8 cm, ER positive, WI positive, Her2 negative, no LVSI, negative margins, 0 out of 2 sentinel lymph nodes, pathologic T1cN0.  Postoperative the patient was seen by Dr. Fuentes who recommended adjuvant anti-endocrine therapy (letrozole). Genetic testing negative.     SITE OF TREATMENT: Right breast     DATES  OF TREATMENT: 3/04/19-3/22/19     TOTAL DOSE OF TREATMENT: 4005 cGy     DOSE PER FRACTION OF TREATMENT: 267 cGy x 15 fractions       COMMENT/TOXICITY:     No breast pain or tenderness. No pruritus.  Applying skin creams.                   INTERVAL SINCE COMPLETION OF RADIATION THERAPY:   7 months     SUBJECTIVE:   Orly Sheridan is a 74 year old female who is here today for routine  follow up after completing radiation therapy.     She is overall doing well. Has noticed slight increase in breast swelling more recently. At times associated with some discomfort. No issues with ROM. No extremity lymphedema. She denies any concerning lumps or masses, nipple discharge bilaterally. Reports well healed incisions with no scar formation.  Denies any cough or shortness of breath related to possible risk of interstitial pneumonitis.      Mammogram due in 2019, patient aware and ordered by surgery team.    Initially started on  Letrozole by medical oncology, but recently changed to anastrozole due to concern for alopecia. Mild hot flashes at times, manageable.     ROS: Patient denies any of the following except if noted above:  pain, headaches,chest pain, dyspnea, abdominal pain, nausea, vomiting, diarrhea. ROS otherwise negative on a 12-system review.  ECO       PHYSICAL EXAM:  BP (!) 168/76   Pulse 71   Resp 18   Wt 83.8 kg (184 lb 12.8 oz)   SpO2 96%   Gen: Alert, in NAD  Pulm: No wheezing, stridor or respiratory distress  CV: Well-perfused, no cyanosis, no pedal edema  Skin: Normal color and turgor  Psychiatric: Appropriate mood and affect  BREAST: No masses or discharge bilaterally. Skin intact. Mild breast edema in treated breast compared to contralateral side. Treated breast has mild residual hyperpigmentation and somewhat raised appearance compared to contralateral side. No issues with ROM. No extremity lymphedema.       LABS AND IMAGING:  Mammogram due 2019 (ordered by surgery)    IMPRESSION:   Ms. Sheridan is a 74 year old female with newly diagnosed right breast invasive ductal carcinoma status post lumpectomy and sentinel lymph node evaluation. Completed radiotherapy to right breast 9778  cGy on 3/22/19.       PLAN:   1. Clinically SATISH. Mammogram pending December 2019 (ordered by surgery team).     2. Has recovered well from treatment overall. Mild breast edema in treated site. Referral to PT/lymphmedema clinic.  Discussed consideration of gentle massage to reduce scarring and edema likely from RT.    3. Continue systemic anti-endocrine therapy per medical oncology team.     4. Continue follow up with surgery. Defer annual mammogram to surgery team.     5. RTC in 6 months.       Anurag Saunders M.D.  Department of Radiation Oncology  [facility]

## 2019-10-21 NOTE — LETTER
10/21/2019      RE: Orly Sheridan  [address]  [facility] [address]          Department of Radiation Oncology  Radiation Therapy Center  [facility] [place] [address]  [phone]     Radiation Oncology Follow-up Visit  10/21/19    Orly Sheridan  MRN: [id number]   : 1945     DIAGNOSIS:  Invasive ductal carcinoma of the right breast status post lumpectomy and sentinel lymph node evaluation  INTENT OF RADIOTHERAPY: curative  PATHOLOGY:  invasive ductal carcinoma, grade 2, 1.8 cm, ER positive, TX positive, Her2 negative, no LVSI, negative margins, 0 out of 2 sentinel lymph nodes                               STAGE: pathologic T1cN0.  CONCURRENT SYSTEMIC THERAPY:  No     ONCOLOGIC HISTORY:     Ms. Sheridan is a [age]  year old female with newly diagnosed right breast invasive ductal carcinoma status post lumpectomy and sentinel lymph node evaluation.      Patient underwent a screening mammogram which demonstrated a spiculated mass in the right breast. Diagnostic imaging on 2018 demonstrated a 1.8 x 1.7 x 1.1 cm mass in the right breast, at the 12 o'clock position, 9 cm from the nipple.  Patient subsequently underwent biopsy of the breast mass on 2018.  Final pathology demonstrated invasive ductal carcinoma, grade 2, no LVSI, positive DCIS, ER positive, TX positive, HER-2 negative.  On 2019 the patient underwent right breast lumpectomy and sentinel lymph node evaluation by Dr. Tate. Final pathology demonstrated invasive ductal carcinoma, grade 2, 1.8 cm, ER positive, TX positive, Her2 negative, no LVSI, negative margins, 0 out of 2 sentinel lymph nodes, pathologic T1cN0.  Postoperative the patient was seen by Dr. Fuentes who recommended adjuvant anti-endocrine therapy (letrozole). Genetic testing negative.     SITE OF TREATMENT: Right breast     DATES  OF TREATMENT: 3/04/19-3/22/19     TOTAL DOSE OF TREATMENT: 4005 cGy     DOSE PER FRACTION OF TREATMENT: 267 cGy x 15  fractions       COMMENT/TOXICITY:     No breast pain or tenderness. No pruritus. Applying skin creams.                   INTERVAL SINCE COMPLETION OF RADIATION THERAPY:   7 months     SUBJECTIVE:   Orly Sheridan is a 74 year old female who is here today for routine  follow up after completing radiation therapy.     She is overall doing well. Has noticed slight increase in breast swelling more recently. At times associated with some discomfort. No issues with ROM. No extremity lymphedema. She denies any concerning lumps or masses, nipple discharge bilaterally. Reports well healed incisions with no scar formation.  Denies any cough or shortness of breath related to possible risk of interstitial pneumonitis.      Mammogram due in 2019, patient aware and ordered by surgery team.    Initially started on  Letrozole by medical oncology, but recently changed to anastrozole due to concern for alopecia. Mild hot flashes at times, manageable.     ROS: Patient denies any of the following except if noted above:  pain, headaches,chest pain, dyspnea, abdominal pain, nausea, vomiting, diarrhea. ROS otherwise negative on a 12-system review.  ECO       PHYSICAL EXAM:  BP (!) 168/76   Pulse 71   Resp 18   Wt 83.8 kg (184 lb 12.8 oz)   SpO2 96%   Gen: Alert, in NAD  Pulm: No wheezing, stridor or respiratory distress  CV: Well-perfused, no cyanosis, no pedal edema  Skin: Normal color and turgor  Psychiatric: Appropriate mood and affect  BREAST: No masses or discharge bilaterally. Skin intact. Mild breast edema in treated breast compared to contralateral side. Treated breast has mild residual hyperpigmentation and somewhat raised appearance compared to contralateral side. No issues with ROM. No extremity lymphedema.       LABS AND IMAGING:  Mammogram due 2019 (ordered by surgery)    IMPRESSION:   Ms. Sheridan is a [age] year old female with newly diagnosed right breast invasive ductal carcinoma status post lumpectomy  and sentinel lymph node evaluation. Completed radiotherapy to right breast 4005 cGy on 3/22/19.       PLAN:   1. Clinically SATISH. Mammogram pending December 2019 (ordered by surgery team).     2. Has recovered well from treatment overall. Mild breast edema in treated site. Referral to PT/lymphmedema clinic.  Discussed consideration of gentle massage to reduce scarring and edema likely from RT.    3. Continue systemic anti-endocrine therapy per medical oncology team.     4. Continue follow up with surgery. Defer annual mammogram to surgery team.     5. RTC in 6 months.       Anurag Saunders M.D.  Department of Radiation Oncology  [facility]

## 2019-10-21 NOTE — NURSING NOTE
FOLLOW-UP VISIT    Patient Name: Orly Sheridan      : 1945     Age: 74 year old        ______________________________________________________________________________     Chief Complaint   Patient presents with     Radiation Therapy     follow up     BP (!) 168/76   Pulse 71   Resp 18   Wt 83.8 kg (184 lb 12.8 oz)   SpO2 96%      Date Radiation Completed: 3/22/19  R breast    Pain  Denies    Meds  Current Med List Reviewed: Yes  Medication Note:     Skin: Warm  Dry  Intact    Range of Motion: no problems reported    Respiratory: No shortness of breath, dyspnea on exertion, cough, or hemoptysis    Hormone Therapy: Yes was on letrazole, changed to anastrzole at last visit with Dr. Roblero.    Lymphedema Follow up: No    Energy Level: normal      Appointments:     DATE  Oncologist: Dr. Roblero    Surgeon:Dr. Campos  upcoming mammo and visit either late December or early January   Primary:   was started on BP med by PCP     Other Notes:

## 2020-04-10 NOTE — PROGRESS NOTES
Department of Radiation Oncology  Radiation Therapy Center  [facility] [place] [address]  [phone]     Radiation Oncology Follow-up Visit  2020       Orly Sheridan                                           MRN: [id number]   : 1945     DIAGNOSIS:  Invasive ductal carcinoma of the right breast status post lumpectomy and sentinel lymph node evaluation  INTENT OF RADIOTHERAPY: curative  PATHOLOGY:  invasive ductal carcinoma, grade 2, 1.8 cm, ER positive, NE positive, Her2 negative, no LVSI, negative margins, 0 out of 2 sentinel lymph nodes                               STAGE: pathologic T1cN0.  CONCURRENT SYSTEMIC THERAPY:  No     ONCOLOGIC HISTORY:     Ms. Sheridan is a 74  year old female with newly diagnosed right breast invasive ductal carcinoma status post lumpectomy and sentinel lymph node evaluation.      Patient underwent a screening mammogram which demonstrated a spiculated mass in the right breast. Diagnostic imaging on 2018 demonstrated a 1.8 x 1.7 x 1.1 cm mass in the right breast, at the 12 o'clock position, 9 cm from the nipple.  Patient subsequently underwent biopsy of the breast mass on 2018.  Final pathology demonstrated invasive ductal carcinoma, grade 2, no LVSI, positive DCIS, ER positive, NE positive, HER-2 negative.  On 2019 the patient underwent right breast lumpectomy and sentinel lymph node evaluation by Dr. Tate. Final pathology demonstrated invasive ductal carcinoma, grade 2, 1.8 cm, ER positive, NE positive, Her2 negative, no LVSI, negative margins, 0 out of 2 sentinel lymph nodes, pathologic T1cN0.  Postoperative the patient was seen by Dr. Fuentes who recommended adjuvant anti-endocrine therapy (letrozole). Genetic testing negative.     SITE OF TREATMENT: Right breast     DATES  OF TREATMENT: 3/04/19-3/22/19     TOTAL DOSE OF TREATMENT: 4005 cGy     DOSE PER FRACTION OF TREATMENT: 267 cGy x 15 fractions       COMMENT/TOXICITY:    "  No breast pain or tenderness. No pruritus. Applying skin creams.                   INTERVAL SINCE COMPLETION OF RADIATION THERAPY:   ~1 year    SUBJECTIVE:   Orly Sheridan is a 74 year old female who is scheduled today for routine follow up after completing radiation therapy.     She is overall doing well. She reports she did have bronchitis while in [place] (2020).  Now recovered.     Last followup she reported slight increase in breast swelling with some associated discomfort. Now resolved. No issues with ROM. She was seen by PT 2019. Discharged with preventive lymphedema exercises.    She denies any concerning lumps or masses, nipple discharge bilaterally. Reports well healed incisions. She did have concern at last visit of raised scar formation. Today she states the scar from the lumpectomy is well healed and flat. She reports she \"cannot even tell where the scar is\" from her axillary lymph node dissection. No enlarged lymph nodes.      Initially started on  Letrozole by medical oncology, but recently changed to Anastrozole due to concern for alopecia. She reports mild hot flashes at times, manageable. She states she recently has run out of Arimidex. She is uncertain when her next appointment with medical oncology is scheduled for.     Last seen by breast surgeon 2020 with mammogram and breast exam. Next follow-up one year.     Continues to follow-up prn with cardiology for history of symptomatic PVCs.     ROS: Patient denies any of the following except if noted above:  pain, headaches,chest pain, dyspnea, abdominal pain, nausea, vomiting.. ROS otherwise negative on a 12-system review.  ECO       PHYSICAL EXAM:  There were no vitals taken for this visit.  No PE telephone visit      LABS AND IMAGING:   There is no radiographic evidence for malignancy.  Recommend   annual mammograms.    A lay language report of this examination will be provided to the patient.       MAMMOGRAM ASSESSMENT:  ACR 2 Benign "   Other Result Information   This result has an attachment that is not available.   Result Narrative   XR MAMMO JELLY BILAT SCREEN [[id number]]    CLINICAL HISTORY:  This is an asymptomatic 74 y.o. patient.    INDICATION FOR EXAM: Mammogram Screening.    TECHNIQUE: CC & MLO views were obtained.  This digital study was evaluated   with the assistance of Computer-Aided Detection. Breast Tomosynthesis was   used in interpretation.      COMPARISON FILMS: Yes  12/11/18 [facility]  11/30/16 [facility]    FINDINGS:  Mammographically, the breast tissue has scattered   fibroglandular densities.  No suspicious masses or microcalcifications.    Post treatment changes within right breast.         IMPRESSION:   Ms. Sheridan is a 74 year old female with newly diagnosed right breast invasive ductal carcinoma status post lumpectomy and sentinel lymph node evaluation. Completed radiotherapy to right breast 4005 cGy on 3/22/19.     Mammogram completed 12/17/2019. No radiographic evidence for malignancy. Recommendation for annual mammogram.     PLAN:   1. Acute toxicities from RT resolved. Discussed long term care with continued moisturizing of the treated breast.  Discussed possibility of scar formation from radiation and treatment with gentle massage. She will follow up here in 6 months.     3. Continue systemic anti-endocrine therapy per medical oncology team. Arimidex (changed from Letrozole due to side effects of alopecia). Dr. Roblero last seen 10/3/2019 with next followup recommended in 6 months. Patient in agreement to call their office for prescription for AI and to schedule next appointment.     4. Continue follow up with surgery. Last seen 12/2019 with breast exam and mammogram. Next follow-up and mammogram due 12/2020. Defer annual mammogram to surgery team    6. Continue to follow-up with PCP for routine health maintenance. (Dr. Aracely Swanson)      Patito Coto [facility]  Radiation Therapy Center  [facility]  [place] [facility]  [address], [address]  [place] [address]

## 2020-04-14 ENCOUNTER — VIRTUAL VISIT (OUTPATIENT)
Dept: RADIATION THERAPY | Facility: OUTPATIENT CENTER | Age: 75
End: 2020-04-14
Payer: MEDICARE

## 2020-04-14 DIAGNOSIS — Z17.0 MALIGNANT NEOPLASM OF RIGHT BREAST IN FEMALE, ESTROGEN RECEPTOR POSITIVE, UNSPECIFIED SITE OF BREAST (H): Primary | ICD-10-CM

## 2020-04-14 DIAGNOSIS — C50.911 MALIGNANT NEOPLASM OF RIGHT BREAST IN FEMALE, ESTROGEN RECEPTOR POSITIVE, UNSPECIFIED SITE OF BREAST (H): Primary | ICD-10-CM

## 2020-04-14 RX ORDER — ASPIRIN 81 MG/1
81 TABLET ORAL DAILY
COMMUNITY

## 2020-04-14 RX ORDER — HYDROCORTISONE 25 MG/G
1 CREAM TOPICAL 2 TIMES DAILY PRN
COMMUNITY
Start: 2019-12-24

## 2020-04-14 NOTE — NURSING NOTE
"Orly Sheridan is a 74 year old female who is being evaluated via a billable telephone visit.      The patient has been notified of following:     \"This telephone visit will be conducted via a call between you and your physician/provider. We have found that certain health care needs can be provided without the need for a physical exam.  This service lets us provide the care you need with a short phone conversation.  If a prescription is necessary we can send it directly to your pharmacy.  If lab work is needed we can place an order for that and you can then stop by our lab to have the test done at a later time.    Telephone visits are billed at different rates depending on your insurance coverage. During this emergency period, for some insurers they may be billed the same as an in-person visit.  Please reach out to your insurance provider with any questions.    If during the course of the call the physician/provider feels a telephone visit is not appropriate, you will not be charged for this service.\"    Patient has given verbal consent for Telephone visit?  Yes    How would you like to obtain your AVS? Mail a copy    Phone call duration: 15 minutes    Eliza Saha RN    FOLLOW-UP VISIT    Patient Name: Orly Sheridan      : 1945     Age: 74 year old        ______________________________________________________________________________     Chief Complaint   Patient presents with     Radiation Therapy     Telephone return visit with Patito Coto CNP     There were no vitals taken for this visit.     Date Radiation Completed: 3/22/19    Pain  Denies    Meds  Current Med List Reviewed: Yes  Medication Note: Patient reports out of Arimidex, needs refill    Skin: Warm  Dry  Intact    Range of Motion: Full    Respiratory: No shortness of breath, dyspnea on exertion, cough, or hemoptysis    Hormone Therapy: Yes    Lymphedema Follow up: No    Energy Level: normal      Appointments:     DATE  Oncologist: Dr. Roblero  " Patient to call to schedule f/u and for Arimidex refill   Surgeon:    Primary:      Other Notes: Follow up with Radiation Oncology in 6 months.

## 2020-10-14 NOTE — PROGRESS NOTES
Department of Radiation Oncology  Radiation Therapy Center  [facility] [place] [address]  [phone]     Radiation Oncology Follow-up Visit  10/16/2020       Orly Sheridan                                           MRN: [id number]   : 1945     DIAGNOSIS:  Invasive ductal carcinoma of the right breast status post lumpectomy and sentinel lymph node evaluation  INTENT OF RADIOTHERAPY: curative  PATHOLOGY:  invasive ductal carcinoma, grade 2, 1.8 cm, ER positive, VA positive, Her2 negative, no LVSI, negative margins, 0 out of 2 sentinel lymph nodes                               STAGE: pathologic T1cN0.  CONCURRENT SYSTEMIC THERAPY:  No     ONCOLOGIC HISTORY:     Ms. Sheridan is a 75 year old female with newly diagnosed right breast invasive ductal carcinoma status post lumpectomy and sentinel lymph node evaluation.      Patient underwent a screening mammogram which demonstrated a spiculated mass in the right breast. Diagnostic imaging on 2018 demonstrated a 1.8 x 1.7 x 1.1 cm mass in the right breast, at the 12 o'clock position, 9 cm from the nipple.  Patient subsequently underwent biopsy of the breast mass on 2018.  Final pathology demonstrated invasive ductal carcinoma, grade 2, no LVSI, positive DCIS, ER positive, VA positive, HER-2 negative.  On 2019 the patient underwent right breast lumpectomy and sentinel lymph node evaluation by Dr. Tate. Final pathology demonstrated invasive ductal carcinoma, grade 2, 1.8 cm, ER positive, VA positive, Her2 negative, no LVSI, negative margins, 0 out of 2 sentinel lymph nodes, pathologic T1cN0.  Postoperative the patient was seen by Dr. Fuentes who recommended adjuvant anti-endocrine therapy.. Genetic testing negative.     SITE OF TREATMENT: Right breast     DATES  OF TREATMENT: 3/04/19-3/22/19     TOTAL DOSE OF TREATMENT: 4005 cGy     DOSE PER FRACTION OF TREATMENT: 267 cGy x 15 fractions               INTERVAL SINCE COMPLETION  OF RADIATION THERAPY:   ~1.5 year    SUBJECTIVE:   Orly Sheridan is a 75 year old female who is scheduled today for routine follow up after completing radiation therapy.     She is overall doing well. She reports she did have bronchitis while in [place] (2020).  Now recovered.     She denies any concerning lumps or masses, nipple discharge bilaterally. Reports well healed incisions. No enlarged lymph nodes.      No issues with ROM. She was seen by PT 2019. Discharged with preventive lymphedema exercises.    Initially started on  Letrozole by medical oncology, but changed to Anastrozole due to concern for alopecia. States tolerating medication well.  Last seen by Dr. Roblero 2020.     Last seen by breast surgeon 2020 with mammogram and breast exam. Next follow-up 2021 with mammogram.     Continues to follow-up prn with cardiology for history of symptomatic PVCs.     ROS: Patient denies any of the following except if noted above:  pain, headaches, chest pain, dyspnea, abdominal pain.. ROS otherwise negative on a 12-system review.  ECO       PHYSICAL EXAM:  BP (!) 142/76 (BP Location: Left arm, Cuff Size: Adult Large)   Pulse 90   Resp 16   Wt 85.9 kg (189 lb 6.4 oz)   General: in no acute distress, alert and oriented x3.   HEENT: Normocephalic, atraumatic.  PERRLA  Lymph Nodes: No palpable pre/post-auricular, cervical, or axillary lymphadenopathy appreciated.   CV: RRR, normal S1 S2.  No murmurs, gallops, or rubs.   Lungs: Clear to auscultation bilaterally.  No dullness to percussion.   Abdomen:  Soft and non tender. No palpable masses.    Extremities: no clubbing, cyanosis, or edema.   Neurologic: Alert and oriented x3. Cranial nerves II-XII grossly intact.   Breast: right breast with well healed incisions. No concerning lumps or masses bilaterally.       LABS AND IMAGIN2019   There is no radiographic evidence for malignancy.  Recommend   annual mammograms.    A lay language report of this  examination will be provided to the patient.       MAMMOGRAM ASSESSMENT:  ACR 2 Benign   Other Result Information   This result has an attachment that is not available.   Result Narrative   XR MAMMO JELLY BILAT SCREEN [[id number]]    CLINICAL HISTORY:  This is an asymptomatic 74 y.o. patient.    INDICATION FOR EXAM: Mammogram Screening.    TECHNIQUE: CC & MLO views were obtained.  This digital study was evaluated   with the assistance of Computer-Aided Detection. Breast Tomosynthesis was   used in interpretation.      COMPARISON FILMS: Yes  12/11/18 [facility]  11/30/16 [facility]    FINDINGS:  Mammographically, the breast tissue has scattered   fibroglandular densities.  No suspicious masses or microcalcifications.    Post treatment changes within right breast.         IMPRESSION:   Ms. Sheridan is a 75 year old female with newly diagnosed right breast invasive ductal carcinoma status post lumpectomy and sentinel lymph node evaluation. Completed radiotherapy to right breast 4005 cGy on 3/22/19.     Mammogram completed 12/17/2019. No radiographic evidence for malignancy. Recommendation for annual mammogram.     PLAN:   1. Acute toxicities from RT resolved. Discussed long term care with continued moisturizing of the treated breast.  Discussed possibility of scar formation from radiation and treatment with gentle massage. She will follow up here PRN.    3. Continue systemic anti-endocrine therapy per medical oncology team. Arimidex (changed from Letrozole due to side effects of alopecia). Continue followup with Dr. Roblero.ti    4. Continue follow up with surgery. Last seen 12/2019 with breast exam and mammogram. Next follow-up and mammogram due 12/2021. Defer annual mammogram to surgery team    6. Continue to follow-up with PCP for routine health maintenance. (Dr. Aracely Swanson)      Patito Coto [facility]  Radiation Therapy Center  [facility]  [address], [address]  [place] [address]

## 2020-10-16 ENCOUNTER — OFFICE VISIT (OUTPATIENT)
Dept: RADIATION THERAPY | Facility: OUTPATIENT CENTER | Age: 75
End: 2020-10-16
Payer: MEDICARE

## 2020-10-16 VITALS
HEART RATE: 90 BPM | SYSTOLIC BLOOD PRESSURE: 142 MMHG | DIASTOLIC BLOOD PRESSURE: 76 MMHG | WEIGHT: 189.4 LBS | RESPIRATION RATE: 16 BRPM

## 2020-10-16 DIAGNOSIS — Z17.0 MALIGNANT NEOPLASM OF RIGHT BREAST IN FEMALE, ESTROGEN RECEPTOR POSITIVE, UNSPECIFIED SITE OF BREAST (H): Primary | ICD-10-CM

## 2020-10-16 DIAGNOSIS — C50.911 MALIGNANT NEOPLASM OF RIGHT BREAST IN FEMALE, ESTROGEN RECEPTOR POSITIVE, UNSPECIFIED SITE OF BREAST (H): Primary | ICD-10-CM

## 2020-10-16 ASSESSMENT — PAIN SCALES - GENERAL: PAINLEVEL: NO PAIN (0)

## 2020-10-16 NOTE — LETTER
10/16/2020         RE: Orly Sheridan  [address]  [facility] [place] [address]        Dear Colleague,    Thank you for referring your patient, Orly Sheridan, to the RADIATION THERAPY CENTER. Please see a copy of my visit note below.       Department of Radiation Oncology  Radiation Therapy Center  [facility]  [place] [address]  [phone]     Radiation Oncology Follow-up Visit  10/16/2020       Orly Sheridan                                           MRN: [id number]   : 1945     DIAGNOSIS:  Invasive ductal carcinoma of the right breast status post lumpectomy and sentinel lymph node evaluation  INTENT OF RADIOTHERAPY: curative  PATHOLOGY:  invasive ductal carcinoma, grade 2, 1.8 cm, ER positive, SC positive, Her2 negative, no LVSI, negative margins, 0 out of 2 sentinel lymph nodes                               STAGE: pathologic T1cN0.  CONCURRENT SYSTEMIC THERAPY:  No     ONCOLOGIC HISTORY:     Ms. Sheridan is a 75 year old female with newly diagnosed right breast invasive ductal carcinoma status post lumpectomy and sentinel lymph node evaluation.      Patient underwent a screening mammogram which demonstrated a spiculated mass in the right breast. Diagnostic imaging on 2018 demonstrated a 1.8 x 1.7 x 1.1 cm mass in the right breast, at the 12 o'clock position, 9 cm from the nipple.  Patient subsequently underwent biopsy of the breast mass on 2018.  Final pathology demonstrated invasive ductal carcinoma, grade 2, no LVSI, positive DCIS, ER positive, SC positive, HER-2 negative.  On 2019 the patient underwent right breast lumpectomy and sentinel lymph node evaluation by Dr. Tate. Final pathology demonstrated invasive ductal carcinoma, grade 2, 1.8 cm, ER positive, SC positive, Her2 negative, no LVSI, negative margins, 0 out of 2 sentinel lymph nodes, pathologic T1cN0.  Postoperative the patient was seen by Dr. Fuentes who recommended adjuvant anti-endocrine therapy.. Genetic  testing negative.     SITE OF TREATMENT: Right breast     DATES  OF TREATMENT: 3/04/19-3/22/19     TOTAL DOSE OF TREATMENT: 4005 cGy     DOSE PER FRACTION OF TREATMENT: 267 cGy x 15 fractions               INTERVAL SINCE COMPLETION OF RADIATION THERAPY:   ~1.5 year    SUBJECTIVE:   Orly Sheridan is a 75 year old female who is scheduled today for routine follow up after completing radiation therapy.     She is overall doing well. She reports she did have bronchitis while in [place] (2020).  Now recovered.     She denies any concerning lumps or masses, nipple discharge bilaterally. Reports well healed incisions. No enlarged lymph nodes.      No issues with ROM. She was seen by PT 2019. Discharged with preventive lymphedema exercises.    Initially started on  Letrozole by medical oncology, but changed to Anastrozole due to concern for alopecia. States tolerating medication well.  Last seen by Dr. Roblero 2020.     Last seen by breast surgeon 2020 with mammogram and breast exam. Next follow-up 2021 with mammogram.     Continues to follow-up prn with cardiology for history of symptomatic PVCs.     ROS: Patient denies any of the following except if noted above:  pain, headaches, chest pain, dyspnea, abdominal pain.. ROS otherwise negative on a 12-system review.  ECO       PHYSICAL EXAM:  BP (!) 142/76 (BP Location: Left arm, Cuff Size: Adult Large)   Pulse 90   Resp 16   Wt 85.9 kg (189 lb 6.4 oz)   General: in no acute distress, alert and oriented x3.   HEENT: Normocephalic, atraumatic.  PERRLA  Lymph Nodes: No palpable pre/post-auricular, cervical, or axillary lymphadenopathy appreciated.   CV: RRR, normal S1 S2.  No murmurs, gallops, or rubs.   Lungs: Clear to auscultation bilaterally.  No dullness to percussion.   Abdomen:  Soft and non tender. No palpable masses.    Extremities: no clubbing, cyanosis, or edema.   Neurologic: Alert and oriented x3. Cranial nerves II-XII grossly intact.   Breast: right  breast with well healed incisions. No concerning lumps or masses bilaterally.       LABS AND IMAGIN2019   There is no radiographic evidence for malignancy.  Recommend   annual mammograms.    A lay language report of this examination will be provided to the patient.       MAMMOGRAM ASSESSMENT:  ACR 2 Benign   Other Result Information   This result has an attachment that is not available.   Result Narrative   XR MAMMO JELLY BILAT SCREEN [[id number]]    CLINICAL HISTORY:  This is an asymptomatic 74 y.o. patient.    INDICATION FOR EXAM: Mammogram Screening.    TECHNIQUE: CC & MLO views were obtained.  This digital study was evaluated   with the assistance of Computer-Aided Detection. Breast Tomosynthesis was   used in interpretation.      COMPARISON FILMS: Yes  18 [facility]  16 [facility]    FINDINGS:  Mammographically, the breast tissue has scattered   fibroglandular densities.  No suspicious masses or microcalcifications.    Post treatment changes within right breast.         IMPRESSION:   Ms. Sheridan is a 75 year old female with newly diagnosed right breast invasive ductal carcinoma status post lumpectomy and sentinel lymph node evaluation. Completed radiotherapy to right breast 4005 cGy on 3/22/19.     Mammogram completed 2019. No radiographic evidence for malignancy. Recommendation for annual mammogram.     PLAN:   1. Acute toxicities from RT resolved. Discussed long term care with continued moisturizing of the treated breast.  Discussed possibility of scar formation from radiation and treatment with gentle massage. She will follow up here PRN.    3. Continue systemic anti-endocrine therapy per medical oncology team. Arimidex (changed from Letrozole due to side effects of alopecia). Continue followup with Dr. Roblero.ti    4. Continue follow up with surgery. Last seen 2019 with breast exam and mammogram. Next follow-up and mammogram due 2021. Defer annual mammogram  to surgery team    6. Continue to follow-up with PCP for routine health maintenance. (Dr. Aracely Swanson)      Patito Coto [facility]  Radiation Therapy Center  [facility]  [address], [address]  [place] [address]

## 2020-10-16 NOTE — NURSING NOTE
FOLLOW-UP VISIT    Patient Name: Orly Sheridan      : 1945     Age: 75 year old        ______________________________________________________________________________     Chief Complaint   Patient presents with     Radiation Therapy     Return visit, breast cancer     BP (!) 142/76 (BP Location: Left arm, Cuff Size: Adult Large)   Pulse 90   Resp 16   Wt 85.9 kg (189 lb 6.4 oz)      Date Radiation Completed: 3/22/2019    Pain  Denies    Meds  Current Med List Reviewed: Yes  Medication Note:     Skin: no issues    Range of Motion: full    Respiratory: No shortness of breath, dyspnea on exertion, cough, or hemoptysis    Hormone Therapy: Yes    Lymphedema Follow up: No    Energy Level: normal      Appointments:     DATE  Oncologist: Osbaldo/CH[place] Next week   Surgeon: Rosalind/JANET F/u in December w/mammo   Primary:      Other Notes:

## 2024-04-29 NOTE — LETTER
3/20/2019      RE: Orly Sheridan  [address]  [place] [address]       [facility]  SPECIALIZING IN BREAKTHROUGHS  Radiation Oncology    On Treatment Visit Note      Orly Sheridan      Date: 3/20/2019   MRN: [id number]   : 1945  Diagnosis: IDC St I      Reason for Visit:  On Radiation Treatment Visit     Treatment Summary to Date  Treatment Site: R breast Current Dose: 2134/4005 cGy Fractions: 13/15           Subjective:   Doing well. No breast pain or tenderness. No pruritus. Doing well. Applying skin creams.     Nursing ROS:   Nutrition Alteration  Diet Type: Patient's Preference  Skin  Skin Reaction: 0 - No changes  Skin Note: started aquaphor        Cardiovascular  Respiratory effort: 1 - Normal - without distress           Pain Assessment  0-10 Pain Scale: 0      Objective:   /81   Pulse 74   Resp 18   Wt 84.3 kg (185 lb 12.8 oz)   SpO2 97%   No skin erythema or desquamation.    Labs:  CBC RESULTS: No results for input(s): WBC, RBC, HGB, HCT, MCV, MCH, MCHC, RDW, PLT in the last 06976 hours.  ELECTROLYTES:  No results for input(s): NA, POTASSIUM, CHLORIDE, NATALIE, CO2, BUN, CR, GLC in the last 35271 hours.    Assessment:    Tolerating radiation therapy well.  All questions and concerns addressed.    Plan:     1. Continue current therapy.  EOT Friday. RTC in 1 month with NP.  2. Continue skin care.  3. Neosporin with pain relief or ibuprofen PRN pain.      Mosaiq chart and setup information reviewed  Ports checked    Medication Review  Med list reviewed with patient?: Yes    Educational Topic Discussed  Education Instructions: reviewed skin care      Anurag Saunders MD             Hydroxychloroquine Pregnancy And Lactation Text: This medication has been shown to cause fetal harm but it isn't assigned a Pregnancy Risk Category. There are small amounts excreted in breast milk.